# Patient Record
Sex: MALE | Race: WHITE | NOT HISPANIC OR LATINO | Employment: FULL TIME | URBAN - METROPOLITAN AREA
[De-identification: names, ages, dates, MRNs, and addresses within clinical notes are randomized per-mention and may not be internally consistent; named-entity substitution may affect disease eponyms.]

---

## 2021-05-06 ENCOUNTER — TELEPHONE (OUTPATIENT)
Dept: URGENT CARE | Facility: CLINIC | Age: 61
End: 2021-05-06

## 2022-12-27 ENCOUNTER — OFFICE VISIT (OUTPATIENT)
Dept: ORTHOPEDIC SURGERY | Facility: CLINIC | Age: 62
End: 2022-12-27

## 2022-12-27 VITALS
BODY MASS INDEX: 25.48 KG/M2 | WEIGHT: 172 LBS | HEIGHT: 69 IN | DIASTOLIC BLOOD PRESSURE: 86 MMHG | SYSTOLIC BLOOD PRESSURE: 130 MMHG

## 2022-12-27 DIAGNOSIS — M75.51 BURSITIS OF RIGHT SHOULDER: ICD-10-CM

## 2022-12-27 DIAGNOSIS — M75.121 NONTRAUMATIC COMPLETE TEAR OF RIGHT ROTATOR CUFF: Primary | ICD-10-CM

## 2022-12-27 DIAGNOSIS — M25.511 RIGHT SHOULDER PAIN, UNSPECIFIED CHRONICITY: ICD-10-CM

## 2022-12-27 DIAGNOSIS — M75.41 IMPINGEMENT SYNDROME OF RIGHT SHOULDER: ICD-10-CM

## 2022-12-27 PROCEDURE — 99204 OFFICE O/P NEW MOD 45 MIN: CPT | Performed by: ORTHOPAEDIC SURGERY

## 2022-12-27 NOTE — PROGRESS NOTES
[facility] [place] [facility] Office Visit - Brendan Barrow MD    Office Visit       Patient Name: Tree Isbell    Chief Complaint:   Chief Complaint   Patient presents with   • Right Shoulder - Pain       Referring Physician: Referring, Self    History of Present Illness:   Tree Isbell is a 62 y.o. male who presents with right body part: shoulder Reason: pain.  Onset:Onset: atraumatic and gradual in nature - seemed like minor trauma at the time but significant pain. The issue has been ongoing for 2 week(s). Pain is a 5/10 on the pain scale. Pain is described as Pain Characterization: dull, aching and stabbing. Associated symptoms include Symptoms: pain, popping and grinding. The pain is worse with working; nothing improve the pain. Previous treatments have included: Activity modifications, NSAIDs.  I have reviewed the patient's history of present illness as noted/entered above.    I have reviewed the patient's past medical history, surgical history, social history, family history, medications, and allergies as noted in the electronic medical record and as noted/entered.  I have reviewed the patient's review of systems as noted/enter and updated as noted in the patient's HPI.    Right shoulder  Pain worsening over the last 2 weeks  Rating the pain 5 out of 10  Pain worsening starting 12/12/2022    Treated with activity modifications, NSAIDs    ACUTE worsening but 15 years of difficulty going overhead    Mounika accompanies him today.      Subjective   Subjective      Review of Systems   Constitutional: Negative.  Negative for chills, fatigue and fever.   HENT: Negative.  Negative for congestion and dental problem.    Eyes: Negative.  Negative for blurred vision.   Respiratory: Negative.  Negative for shortness of breath.    Cardiovascular: Negative.  Negative for leg swelling.   Gastrointestinal: Negative.  Negative for abdominal pain.  "  Endocrine: Negative.  Negative for polyuria.   Genitourinary: Negative.  Negative for difficulty urinating.   Musculoskeletal: Positive for arthralgias.   Skin: Negative.    Allergic/Immunologic: Negative.    Neurological: Negative.    Hematological: Negative.  Negative for adenopathy.   Psychiatric/Behavioral: Negative.  Negative for behavioral problems.        Past Medical History:   Past Medical History:   Diagnosis Date   • Fracture of wrist    • Knee swelling    • Rotator cuff syndrome        Past Surgical History:   Past Surgical History:   Procedure Laterality Date   • VASECTOMY         Family History:   Family History   Problem Relation Age of Onset   • Arthritis Mother    • Gout Mother    • Diabetes Father    • Hypertension Father    • Kidney disease Father        Social History:   Social History     Socioeconomic History   • Marital status: Single   Tobacco Use   • Smoking status: Former     Types: Cigarettes   • Smokeless tobacco: Never   Vaping Use   • Vaping Use: Never used   Substance and Sexual Activity   • Alcohol use: Yes     Alcohol/week: 12.0 standard drinks     Types: 12 Cans of beer per week   • Drug use: Never   • Sexual activity: Yes     Partners: Female       Medications: No current outpatient medications on file.    Allergies:   Allergies   Allergen Reactions   • Sulfa Antibiotics Rash       The following portions of the patient's history were reviewed and updated as appropriate: allergies, current medications, past family history, past medical history, past social history, past surgical history and problem list.        Objective    Objective      Vital Signs:   Vitals:    12/27/22 1112   BP: 130/86   Weight: 78 kg (172 lb)   Height: 175.3 cm (69\")       Ortho Exam:  RIGHT SHOULDER  General: no acute distress, comfortable limitations noted  Vitals reviewed in chart    Musculoskeletal Exam    SIDE: Right shoulder  Shoulder Exam:    Tenderness: rotator cuff    No obvious clinical atrophy but " concerns for chronic tearing given clinical findings and radiographic findings    Range of motion measurements (degrees)  Forward flexion/Abduction/External rotation at side/ER at 90/IR at 90/IR position  Active: Dysrhythmic motion but is able to go overhead 120/120/45/70/60  Passive: 140/140/50/80/70    Painful arc of motion: yes  No evidence of septic joint  Pain with forward flexion and abduction greater: 90 degrees  Impingement testing Neer's test - positive/painful  Impingement testing Hawkin's test - positive/painful    Rotator Cuff Testing:  Tenderness to palpation at rotator cuff - YES  Rotator cuff testing Roseline's test - positive  Rotator cuff testing External rotation - no  Rotator cuff testing Lag signs - no  Rotator cuff testing Belly press - no  Pain with abduction great than 90 degrees - yes    Scapular dyskinesis - present, abnormal scapular motion    Long head of the biceps testing:  Diallo's test for biceps & Speed's test - mild  Bicipital groove tenderness to palpation/tenderness to palpation of biceps tendon - mild        Results Review:   Imaging Results (Last 24 Hours)     Procedure Component Value Units Date/Time    XR Shoulder 2+ View Right [[id number]] Resulted: 12/27/22 1148     Updated: 12/27/22 1150    Narrative:      Imaging: shoulder x-rays 2 views - AP and axillary x-ray views    Side: Right shoulder    Indication for shoulder x-ray 2 views: shoulder pain    Comparison: no comparison views available    Findings: No acute bony pathology.  There is evidence of subtle superior   humeral head migration.  The humeral head remains centered in the   glenohumeral joint. No evidence of calcific tendonitis.    The subtle superior migration is noted along with some subtle narrowing of   the acromiohumeral index.    I personally reviewed the above x-rays.            Procedures             Assessment / Plan      Assessment/Plan:   Problem List Items Addressed This Visit        Musculoskeletal and  Injuries    Nontraumatic complete tear of right rotator cuff - Primary    Relevant Orders    MRI Shoulder Right Without Contrast    Impingement syndrome of right shoulder    Relevant Orders    MRI Shoulder Right Without Contrast    Bursitis of right shoulder    Relevant Orders    MRI Shoulder Right Without Contrast    Right shoulder pain    Relevant Orders    MRI Shoulder Right Without Contrast       RIGHT SHOULDER  MRI of the shoulder is recommended.  Indication: suspected rotator cuff tear  The MRI is critical to evaluate for rotator cuff tearing and will help with possible surgical planning.    I counseled that I suspect he has chronic rotator cuff pathology, chronic rotator cuff tearing that he has been quite stoic and had deltoid compensation to deal with.  He did have an acute setback in the last 2 weeks and MRI warranted at this time.    Follow Up: After right shoulder MRI        Brendan Barrow MD, FAAOS  Orthopedic Surgeon  Fellowship Trained Shoulder and Elbow Surgeon  [facility]  Orthopedics and Sports Medicine  [address] [place] [address], [address]  [place] [address]    12/27/22  12:13 EST

## 2023-10-23 ENCOUNTER — TELEPHONE (OUTPATIENT)
Dept: UROLOGY | Facility: CLINIC | Age: 63
End: 2023-10-23

## 2023-10-23 NOTE — TELEPHONE ENCOUNTER
"  Caller: Tree Isbell    Relationship: Self    Best call back number: [phone]    What is the best time to reach you: ANY    Who are you requesting to speak with (clinical staff, provider,  specific staff member): JOSHUA    Do you know the name of the person who called: JOSHUA    What was the call regarding: SCHEDULING    Notes: MR. ISBELL WANTS TO SCHEDULE AN APPOINTMENT WITH DR. HODGE. HOWEVER, PATIENT IS NOT ESTABLISHED AND DOES NOT HAVE A REFERRAL. MR. ISBELL STATES THAT HIS GIRLFRIEND WORKS WITH DR. HODGE AND THAT HE HAS A MESSAGE FROM \"JOSHUA\" THAT SAYS WE SHOULD BE ABLE TO GET HIM SCHEDULED NOW.    PLEASE CALL MR. ISBELL TO DISCUSS.     ---UNABLE TO WARM TRANSFER.         "

## 2023-10-26 ENCOUNTER — OFFICE VISIT (OUTPATIENT)
Dept: UROLOGY | Facility: CLINIC | Age: 63
End: 2023-10-26
Payer: MEDICAID

## 2023-10-26 VITALS
WEIGHT: 155 LBS | HEIGHT: 69 IN | BODY MASS INDEX: 22.96 KG/M2 | DIASTOLIC BLOOD PRESSURE: 86 MMHG | SYSTOLIC BLOOD PRESSURE: 132 MMHG | HEART RATE: 79 BPM | TEMPERATURE: 98.3 F | OXYGEN SATURATION: 97 %

## 2023-10-26 DIAGNOSIS — N41.9 PROSTATITIS, UNSPECIFIED PROSTATITIS TYPE: Primary | ICD-10-CM

## 2023-10-26 LAB
BILIRUB BLD-MCNC: NEGATIVE MG/DL
CLARITY, POC: CLEAR
COLOR UR: YELLOW
EXPIRATION DATE: NORMAL
GLUCOSE UR STRIP-MCNC: NEGATIVE MG/DL
KETONES UR QL: NEGATIVE
LEUKOCYTE EST, POC: NEGATIVE
Lab: NORMAL
NITRITE UR-MCNC: NEGATIVE MG/ML
PH UR: 6 [PH] (ref 5–8)
PROT UR STRIP-MCNC: NEGATIVE MG/DL
RBC # UR STRIP: NEGATIVE /UL
SP GR UR: 1.01 (ref 1–1.03)
UROBILINOGEN UR QL: NORMAL

## 2023-10-26 NOTE — PROGRESS NOTES
"Chief Complaint  LUTS    Referring Provider  No ref. provider found    HPI  Mr. Isbell is a 63 y.o. male with history of vasectomy who presents with multiple urologic concerns.    He tells me that for years he has experienced intermittent episodes of burning with urination.  He states the burning feels like it is isolated to the tip of the penis primarily.  It has sometimes been associated with episodes of prolonged perineal pressure such as riding a motorcycle or an exercise bike.  It typically takes several days to improve, though sometimes improves with increased fluid intake such as water and dilute cranberry juice.  It has never been associated with hematuria.    He also tells me that for years he has had intermittent episodes of perineal discomfort attributed to the prostate.  He states this most recently happened a couple of months ago.  He states he was walking in [facility] and had rather severe discomfort \"between the legs\".  He states it resolved without intervention and has not returned.  Admits to associated lifelong chronic low back pain that is relieved with standing and walking, worse with sitting, laying in the bed, or sitting very upright.  He states that this radiates around into the pelvis.    He is also concerned about decreased ejaculate volume.  He tells me he first noticed it about 10 years ago.  He tells me that when he was quite a bit younger, he produced very large volumes of ejaculate and would actually experience discomfort from increasing pressure if he did not have regular ejaculations.  He denies noticing milky or cloudy urine following orgasm, denies changes in sensation in the penis, or difficulty with orgasm.    IPSS Questionnaire (AUA-7):  Over the past month…    1)  Incomplete Emptying  How often have you had a sensation of not emptying your bladder?  1 - Less than 1 time in 5   2)  Frequency  How often have you had to urinate less than every two hours? 2 - Less than half the time "   3)  Intermittency  How often have you found you stopped and started again several times when you urinated?  1 - Less than 1 time in 5   4) Urgency  How often have you found it difficult to postpone urination?  3 - About half the time   5) Weak Stream  How often have you had a weak urinary stream?  1 - Less than 1 time in 5   6) Straining  How often have you had to push or strain to begin urination?  0 - Not at all   7) Nocturia  How many times did you typically get up at night to urinate?  1 - 1 time   Total Score:  9       Quality of life due to urinary symptoms:  If you were to spend the rest of your life with your urinary condition the way it is now, how would you feel about that? 1-Pleased   Urine Leakage (Incontinence) 0-No Leakage         Past Medical History  Past Medical History:   Diagnosis Date    Fracture of wrist     Knee swelling     Prostatitis     Rotator cuff syndrome        Past Surgical History  Past Surgical History:   Procedure Laterality Date    VASECTOMY         Medications  No current outpatient medications on file.    Allergies  Allergies   Allergen Reactions    Sulfa Antibiotics Rash       Social History  Social History     Socioeconomic History    Marital status: Single   Tobacco Use    Smoking status: Former     Types: Cigarettes    Smokeless tobacco: Never   Vaping Use    Vaping Use: Never used   Substance and Sexual Activity    Alcohol use: Yes     Alcohol/week: 12.0 standard drinks of alcohol     Types: 12 Cans of beer per week    Drug use: Never    Sexual activity: Yes     Partners: Female       Family History  He has no family history of prostate cancer  Family History   Problem Relation Age of Onset    Arthritis Mother     Gout Mother     Diabetes Father     Hypertension Father     Kidney disease Father          Physical Exam  Visit Vitals  /86 (BP Location: Left arm, Patient Position: Sitting, Cuff Size: Adult)   Pulse 79   Temp 98.3 °F (36.8 °C) (Temporal)   Ht 175.3 cm  "(69\")   Wt 70.3 kg (155 lb)   SpO2 97%   BMI 22.89 kg/m²     Physical exam notable for benign MAGUE.    Genitourinary  Rectal:  Normal tone, no masses.  Prostate:  Mildly enlarged. Symmetric, non-tender, anodular and no induration.      Labs    Brief Urine Lab Results  (Last result in the past 365 days)        Color   Clarity   Blood   Leuk Est   Nitrite   Protein   CREAT   Urine HCG        10/26/23 1548 Yellow   Clear   Negative   Negative   Negative   Negative                   Assessment  Mr. Isbell is a 63 y.o. male who presents with intermittent dysuria, intermittent perineal tenderness, decreased ejaculate volume.    He tells me that all 3 of the symptoms have been ongoing for years.  He currently has been experiencing the dysuria for the past several weeks, notes it is better with increased fluid intake.  It is not associated with changes in the genitals or hematuria.  His urine is benign.  His MAGUE is benign and he has no prostate tenderness concerning for prostatitis.  He currently has no perineal tenderness and tells me it has been months since he experienced those symptoms.    PSA collected on 10/20/23: 1.2.  We discussed that PSA below 4 is favored to have lower risk for an underlying prostate cancer.  We did acknowledge that serial PSAs are important in the detection of prostate cancer as a rise in PSA even before exceeding 4 could be an early sign of cancer.  We discussed that irritative voiding symptoms are not a common sign of prostate cancer.     We discussed irritative urinary symptoms as a possible sign of urothelial malignancy.  I advised that this is usually associated with abnormalities on UA such as hematuria or pyuria. 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  With that said, his dysuria is intermittent and has never been associated with gross or microscopic hematuria to his knowledge.  He does have a smoking history, but " says it is definitely less than 10 pack years.  He has no family history of urothelial cancers.      I advised cystoscopy as the test of choice to fully examine the bladder mucosa.  He tells me that he believes he probably prefers observation and serial urine testing as he is self-pay and concerned about the cost.  I have advised him that he should plan to have urine testing during an episode of symptoms to determine whether or not microscopic hematuria is present, which would further prompt the necessity for cystoscopy.      Finally, we discussed common etiologies for decreased ejaculate volume including medications, diabetes, retrograde ejaculation, ejaculatory duct obstruction.  He takes no medications, denies any symptoms of retrograde ejaculation.  He denies a history of diabetes and has no glucosuria today.  I advised that if he is bothered by decreased ejaculate volume, we can examine for ejaculatory duct calcification or other abnormalities.  He tells me that he is not bothered enough by the symptoms to want to pursue procedural work-up.    Plan  1.  Follow up for cystoscopy as desired or for hematuria/worsening symptoms  2.  Recommend annual PSA until at least 70 years old  3.  Recommend obtaining a urine specimen with microscopy during episodes of dysuria      Lory Latif PA-C    Greater than 40 minutes was spent in face to face discussion of multiple chronic urological problems, possible significance, performing physical exam, discussing outside lab results, and recommending future diagnostic workup.